# Patient Record
Sex: FEMALE | NOT HISPANIC OR LATINO | URBAN - NONMETROPOLITAN AREA
[De-identification: names, ages, dates, MRNs, and addresses within clinical notes are randomized per-mention and may not be internally consistent; named-entity substitution may affect disease eponyms.]

---

## 2024-10-08 ENCOUNTER — APPOINTMENT (RX ONLY)
Dept: URBAN - NONMETROPOLITAN AREA CLINIC 28 | Facility: CLINIC | Age: 34
Setting detail: DERMATOLOGY
End: 2024-10-08

## 2024-10-08 DIAGNOSIS — L20.9 ATOPIC DERMATITIS, UNSPECIFIED: ICD-10-CM | Status: RESOLVING

## 2024-10-08 PROCEDURE — 99203 OFFICE O/P NEW LOW 30 MIN: CPT

## 2024-10-08 PROCEDURE — ? COUNSELING

## 2024-10-08 PROCEDURE — ? OBSERVATION

## 2024-10-08 PROCEDURE — ? PRESCRIPTION MEDICATION MANAGEMENT

## 2024-10-08 ASSESSMENT — LOCATION SIMPLE DESCRIPTION DERM
LOCATION SIMPLE: RIGHT CHEEK
LOCATION SIMPLE: LEFT CHEEK

## 2024-10-08 ASSESSMENT — LOCATION DETAILED DESCRIPTION DERM
LOCATION DETAILED: RIGHT INFERIOR MEDIAL MALAR CHEEK
LOCATION DETAILED: LEFT INFERIOR CENTRAL MALAR CHEEK

## 2024-10-08 ASSESSMENT — BSA ECZEMA: % BODY COVERED IN ECZEMA: 2

## 2024-10-08 ASSESSMENT — SEVERITY ASSESSMENT 2020: SEVERITY 2020: MILD

## 2024-10-08 ASSESSMENT — LOCATION ZONE DERM: LOCATION ZONE: FACE

## 2024-10-08 ASSESSMENT — ITCH NUMERIC RATING SCALE: HOW SEVERE IS YOUR ITCHING?: 2

## 2024-10-08 NOTE — PROCEDURE: PRESCRIPTION MEDICATION MANAGEMENT
Samples Given: Opzelura-apply topically to face bid.\\nVanicream\\nCerave\\nCetaphil
Render In Strict Bullet Format?: No
Detail Level: Zone

## 2024-10-08 NOTE — HPI: SKIN LESION
What Type Of Note Output Would You Prefer (Optional)?: Standard Output
How Severe Is Your Skin Lesion?: moderate
Is This A New Presentation, Or A Follow-Up?: Skin Lesions
Which Family Member (Optional)?: Maternal Great Grandfather

## 2024-10-08 NOTE — PROCEDURE: MIPS QUALITY
Quality 155 (Denominator): Falls Plan Of Care: Falls plan of care documented
Detail Level: Detailed
Quality 226: Preventive Care And Screening: Tobacco Use: Screening And Cessation Intervention: Patient screened for tobacco use and is an ex/non-smoker
Quality 130: Documentation Of Current Medications In The Medical Record: Current Medications Documented
Quality 47: Advance Care Plan: Advance Care Planning discussed and documented in the medical record; patient did not wish or was not able to name a surrogate decision maker or provide an advance care plan.
Quality 431: Preventive Care And Screening: Unhealthy Alcohol Use - Screening: Patient not identified as an unhealthy alcohol user when screened for unhealthy alcohol use using a systematic screening method

## 2024-11-21 ENCOUNTER — APPOINTMENT (RX ONLY)
Dept: URBAN - NONMETROPOLITAN AREA CLINIC 28 | Facility: CLINIC | Age: 34
Setting detail: DERMATOLOGY
End: 2024-11-21

## 2024-11-21 DIAGNOSIS — D22 MELANOCYTIC NEVI: ICD-10-CM

## 2024-11-21 DIAGNOSIS — L20.9 ATOPIC DERMATITIS, UNSPECIFIED: ICD-10-CM | Status: WELL CONTROLLED

## 2024-11-21 PROBLEM — D22.5 MELANOCYTIC NEVI OF TRUNK: Status: ACTIVE | Noted: 2024-11-21

## 2024-11-21 PROCEDURE — ? TREATMENT REGIMEN

## 2024-11-21 PROCEDURE — 99213 OFFICE O/P EST LOW 20 MIN: CPT

## 2024-11-21 PROCEDURE — ? PRESCRIPTION

## 2024-11-21 PROCEDURE — ? COUNSELING

## 2024-11-21 PROCEDURE — ? OBSERVATION

## 2024-11-21 RX ORDER — RUXOLITINIB 15 MG/G
CREAM TOPICAL
Qty: 60 | Refills: 5 | Status: ERX | COMMUNITY
Start: 2024-11-21

## 2024-11-21 RX ADMIN — RUXOLITINIB: 15 CREAM TOPICAL at 00:00

## 2024-11-21 ASSESSMENT — LOCATION DETAILED DESCRIPTION DERM
LOCATION DETAILED: LEFT INFERIOR CENTRAL MALAR CHEEK
LOCATION DETAILED: LEFT INFERIOR UPPER BACK
LOCATION DETAILED: RIGHT INFERIOR MEDIAL MALAR CHEEK

## 2024-11-21 ASSESSMENT — LOCATION SIMPLE DESCRIPTION DERM
LOCATION SIMPLE: RIGHT CHEEK
LOCATION SIMPLE: LEFT UPPER BACK
LOCATION SIMPLE: LEFT CHEEK

## 2024-11-21 ASSESSMENT — BSA ECZEMA: % BODY COVERED IN ECZEMA: 0

## 2024-11-21 ASSESSMENT — LOCATION ZONE DERM
LOCATION ZONE: TRUNK
LOCATION ZONE: FACE

## 2024-11-21 ASSESSMENT — ITCH NUMERIC RATING SCALE: HOW SEVERE IS YOUR ITCHING?: 0

## 2024-11-21 ASSESSMENT — SEVERITY ASSESSMENT 2020: SEVERITY 2020: CLEAR

## 2024-11-21 NOTE — PROCEDURE: TREATMENT REGIMEN
Show Eucerin Line: Yes
Action 3: Continue
Detail Level: Zone
Continue Regimen: Opzelura 1.5 % topical cream \\nQuantity: 60.0 g  Days Supply: 30\\nSig: Apply topically to affected areas on face BID
Samples Given: Opzelura, Vanicream

## 2025-05-23 ENCOUNTER — APPOINTMENT (OUTPATIENT)
Dept: URBAN - NONMETROPOLITAN AREA CLINIC 28 | Facility: CLINIC | Age: 35
Setting detail: DERMATOLOGY
End: 2025-05-23

## 2025-05-23 DIAGNOSIS — D22 MELANOCYTIC NEVI: ICD-10-CM

## 2025-05-23 DIAGNOSIS — D18.0 HEMANGIOMA: ICD-10-CM

## 2025-05-23 DIAGNOSIS — L20.9 ATOPIC DERMATITIS, UNSPECIFIED: ICD-10-CM

## 2025-05-23 PROBLEM — D18.01 HEMANGIOMA OF SKIN AND SUBCUTANEOUS TISSUE: Status: ACTIVE | Noted: 2025-05-23

## 2025-05-23 PROBLEM — D22.5 MELANOCYTIC NEVI OF TRUNK: Status: ACTIVE | Noted: 2025-05-23

## 2025-05-23 PROCEDURE — ? PRESCRIPTION

## 2025-05-23 PROCEDURE — ? COUNSELING

## 2025-05-23 PROCEDURE — ? TREATMENT REGIMEN

## 2025-05-23 PROCEDURE — ? OBSERVATION

## 2025-05-23 PROCEDURE — 99213 OFFICE O/P EST LOW 20 MIN: CPT

## 2025-05-23 RX ORDER — RUXOLITINIB 15 MG/G
CREAM TOPICAL
Qty: 60 | Refills: 5 | Status: ACTIVE

## 2025-05-23 ASSESSMENT — LOCATION ZONE DERM
LOCATION ZONE: ARM
LOCATION ZONE: TRUNK
LOCATION ZONE: FACE

## 2025-05-23 ASSESSMENT — LOCATION SIMPLE DESCRIPTION DERM
LOCATION SIMPLE: CHEST
LOCATION SIMPLE: LEFT CHEEK
LOCATION SIMPLE: LEFT SHOULDER
LOCATION SIMPLE: RIGHT CHEEK
LOCATION SIMPLE: LEFT UPPER BACK

## 2025-05-23 ASSESSMENT — LOCATION DETAILED DESCRIPTION DERM
LOCATION DETAILED: LEFT INFERIOR UPPER BACK
LOCATION DETAILED: LEFT INFERIOR CENTRAL MALAR CHEEK
LOCATION DETAILED: LOWER STERNUM
LOCATION DETAILED: RIGHT INFERIOR MEDIAL MALAR CHEEK
LOCATION DETAILED: LEFT ANTERIOR SHOULDER

## 2025-05-23 ASSESSMENT — SEVERITY ASSESSMENT 2020: SEVERITY 2020: ALMOST CLEAR

## 2025-05-23 ASSESSMENT — ITCH NUMERIC RATING SCALE: HOW SEVERE IS YOUR ITCHING?: 1

## 2025-05-23 ASSESSMENT — BSA ECZEMA: % BODY COVERED IN ECZEMA: 1

## 2025-05-23 NOTE — PROCEDURE: MIPS QUALITY
Quality 155: Falls Plan Of Care: Falls plan of care documented
Detail Level: Detailed
Quality 226: Preventive Care And Screening: Tobacco Use: Screening And Cessation Intervention: Patient screened for tobacco use and is an ex/non-smoker
Quality 130: Documentation Of Current Medications In The Medical Record: Current Medications Documented
Quality 47: Advance Care Plan: Advance Care Planning discussed and documented in the medical record; patient did not wish or was not able to name a surrogate decision maker or provide an advance care plan.
Quality 431: Preventive Care And Screening: Unhealthy Alcohol Use - Screening: Patient not identified as an unhealthy alcohol user when screened for unhealthy alcohol use using a systematic screening method